# Patient Record
(demographics unavailable — no encounter records)

---

## 2024-11-12 NOTE — DISCUSSION/SUMMARY
[FreeTextEntry1] : gyn exam menopause pap Discussion and interpretation of previous tests , external notes( labs, radiology, specialist  , patient verbalized understanding.   Bone  Health maintenance with calcium and Vitamin D. Recommend weight bearing exercises for at least 30 minutes daily 5 times a week and light resistance strength training for at least two days a week. Osteoporosis screening with a bone density every 2 years. mammogram due december cont bse   weight gain wegovy .5 mg q week #4

## 2024-11-12 NOTE — CHIEF COMPLAINT
[Annual Visit] : annual visit [FreeTextEntry1] :  she started wegovy 11/3 and she hasnt lost anything 1/26/22 gyn   she had a D + C  she sees Dr Ortega getting acupuncture  for oab.

## 2024-11-12 NOTE — REVIEW OF SYSTEMS
[Dysuria] : dysuria [Pelvic Pain] : pelvic pain [Nl] : Integumentary [Recent Wt Gain ___ Lbs] : no recent weight gain

## 2024-11-12 NOTE — HISTORY OF PRESENT ILLNESS
[Good] : being in good health [Healthy Diet] : a healthy diet [Regular Exercise] : regular exercise [Pap Smear Last Year] : Papanicolaou cytology last year [Last Mammogram ___] : last mammogram done [unfilled] [Last Colonoscopy ___] : last colonoscopy done [unfilled] [Postmenopausal] : is postmenopausal [4/10] : is 4/10 in severity [Definite:  ___ (Date)] : the last menstrual period was [unfilled] [Post-Menopause, No Sxs] : post-menopausal, currently without symptoms [Menopause Age: ____] : age at menopause was [unfilled] [Age of First Sexual Lindy ___] : became sexually active at the age of [unfilled] [Male ___] : [unfilled] male [___ Year(s) Ago] : [unfilled] year(s) ago [Weight Concerns] : no concerns with her weight [Continuous] : no continuous [Dull] : no dull [Sharp] : no sharp [Stabbing] : no stabbing [Throbbing] : no throbbing [Fever] : no fever [Nausea] : no nausea [Vomiting] : no vomiting [Diarrhea] : no diarrhea [Vaginal Bleeding] : no vaginal bleeding [Pelvic Pressure] : no pelvic pressure [Dysuria] : no dysuria [Non-opiod Analgesic] : not improved by non-opiod analgesic [Opiod Analgesic] : not improved by opiod analgesic [Rest] : not improved with rest [Activity] : not worsened by activity [Defecation] : not worsened by defecation [Emotional Stress] : not worsened by emotional stress [[place]] : not worsened by intercourse [Lifting] : not worsened by lifting [Menses] : not worsened by menses [Movement] : not worsened by movement [Urination] : not worsened by urination [Appendicitis] : no history of appendicitis [Cholelithiasis] : no history of cholelithiasis [Crohn's Disease] : no history of Crohn's disease [Diverticular Disease] : no history of Diverticular disease [Intrauterine Pregnancy] : no history of intrauterine pregnancy [Nephrolithiasis] : no history of nephrolithiasis [Ovarian Mass] : no history of ovarian mass [Ovarian Torsion] : no history of ovarian torsion [Burning] : no burning [Itching] : no itching [Mass] : no mass [Stinging] : no stinging [Lesion] : no lesion [Soreness] : no soreness [FreeTextEntry9] : no [Hot Flashes] : no hot flashes [Night Sweats] : no night sweats [Vaginal Itching] : no vaginal itching [Dyspareunia] : no dyspareunia [Mood Changes] : no mood changes [FreeTextEntry8] : no [Currently In Menopause] : not currently in menopause [Sexually Active] : is not sexually active

## 2024-11-12 NOTE — COUNSELING
[Breast Self Exam] : breast self exam [Nutrition] : nutrition [Exercise] : exercise [Vitamins/Supplements] : vitamins/supplements [Confidentiality] : confidentiality [Sunscreen] : sunscreen [Medication Management] : medication management [Body Image] : body image

## 2024-11-12 NOTE — OB HISTORY
[Pregnancy History] : boy [___] : no pregnancy complications reported [FreeTextEntry1] : placenta previa

## 2024-12-16 NOTE — HISTORY OF PRESENT ILLNESS
[FreeTextEntry1] : patient presents for follow-up and med renewal on [facility] [de-identified] : 66 yo wf here for weight mgt. she is on wegovy since nov 3 rd. she is paying out of pocket  she weighted  197 she lost  6 pounds she is doing the injection on sunday she is eating: a lot less.  b- yogurt blueberries dates she got protein pancakes instead  or eggs lunch chicken or left over meat loaf  she is not exercising -she is tired b her daughter  has troubles with baby. not tolerating formula

## 2024-12-16 NOTE — ASSESSMENT
[FreeTextEntry1] : overweight The BMI for OBIE was assessed. The BMI was found to be high, intervention performed was: lifestyle education regarding diet. Being overweight can negatively impact your health  by increasing risk of high blood pressure, heart disease, stroke, arthritis, or joint pain and diabetes. It is important to exercise 150 minutes per week as well as maintain a calorie appropriate diet that is rich in fruits and vegetables, and low in excess salt and fat. cont wegovy same dose .5 mg q week  4 injections  fu 4 w mammogram reviewed

## 2025-01-23 NOTE — ASSESSMENT
[FreeTextEntry1] : weight  The BMI for OBIE was assessed. The BMI was found to be 29 intervention performed was: lifestyle education regarding diet. Being overweight can negatively impact your health  by increasing risk of high blood pressure, heart disease, stroke, arthritis, or joint pain and diabetes. It is important to exercise 150 minutes per week as well as maintain a calorie appropriate diet that is rich in fruits and vegetables, and low in excess salt and fat. she has been on wegovy and she does not feel appetite suppression. she is not exercising. she lost total 7 pounds. goal is 170 she is 5'7"  increase wegovy to 1 mg q week . she takes on sundays. she is eats the most usually m-w. she is encouraged to exercise, use bands. do home exercise. she says it is too cold and she doesnt feel well.   encouraged to have swab, cxr she refused.  fu  1m

## 2025-01-23 NOTE — HISTORY OF PRESENT ILLNESS
[Home] : at home, [unfilled] , at the time of the visit. [Medical Office: ([facility])___] : at the medical office located in  [Verbal consent obtained from patient] : the patient, [unfilled] [FreeTextEntry1] : follow up [de-identified] : Patient initiated communication for concern via HIPAA secure platform  (Telemedicine )  for         follow up             using  telehealth            .Reviewed quarantine instructions and self isolation to limit spread of disease  as per [facility] guidelines.  Patient is an established patient and has verbalized consent to be treated via telemedicine . she lost 4 pounds since dec 16. she does not feel appetite suppression.    she would like to increase the dose. she has a goal of 170 . she is mindful of what she eats she keeps track breakfast egg yogurt oatmeal snack bag of cranberries. lunch is protein. she bumps up protein somehow someway. she is not exercising  bc of the cold and she does not feel well..  she does babysit.  co ongoing cough since christmas she tested neg for covid. she is not short of breath no fever  she does not want a swab or cxr today

## 2025-01-23 NOTE — REVIEW OF SYSTEMS
[Fever] : no fever [Chills] : no chills [Fatigue] : no fatigue [Shortness Of Breath] : no shortness of breath [Wheezing] : no wheezing [Cough] : cough [Negative] : Cardiovascular [FreeTextEntry2] : -4  (-7 total) bm 29

## 2025-02-19 NOTE — ASSESSMENT
[FreeTextEntry1] : bmi 30 The BMI for OBIE was assessed. The BMI was found to be high, intervention performed was: lifestyle education regarding diet. Being overweight can negatively impact your health  by increasing risk of high blood pressure, heart disease, stroke, arthritis, or joint pain and diabetes. It is important to exercise 150 minutes per week as well as maintain a calorie appropriate diet that is rich in fruits and vegetables, and low in excess salt and fat. increase wegovy to 1.7 q week

## 2025-02-19 NOTE — HISTORY OF PRESENT ILLNESS
[FreeTextEntry1] : patient presents for follow up and wants an increase on medication ( wegovy 1MG/0.5ML)  limited portion    [de-identified] : 66 yo wf hx of adenomyosis insulin resistance syndrome oab osteopenia thyroid nodule BMI 39 she still feels hungry  she is limiting her portions.  she eats meat veg and salad. she takes less she is exercising but it is too cold

## 2025-03-19 NOTE — ASSESSMENT
[FreeTextEntry1] : bmi 29 The BMI for BOIE was assessed. The BMI was found to be high, intervention performed was: lifestyle education regarding diet. Being overweight can negatively impact your health  by increasing risk of high blood pressure, heart disease, stroke, arthritis, or joint pain and diabetes. It is important to exercise 150 minutes per week as well as maintain a calorie appropriate diet that is rich in fruits and vegetables, and low in excess salt and fat. increase wegovy to 2.4 q week exercise daily.  consider prev 20  mc

## 2025-03-19 NOTE — CURRENT MEDS
[Takes medication as prescribed] : takes [Lack of understanding] : lack of understanding [Other ___] : [unfilled] [Yes] : Reviewed medication list for presence of high-risk medications. [Benzodiazepines] : benzodiazepines [Opioids] : opioids [Blood Thinners] : blood thinners [Muscle Relaxants] : muscle relaxants [Sedatives] : sedatives

## 2025-03-19 NOTE — HISTORY OF PRESENT ILLNESS
[FreeTextEntry1] : patient presents for follow up and wants an increase on medication ( wegovy 1MG/0.5ML)  limited portion    [de-identified] : 68 yo wf hx of adenomyosis insulin resistance syndrome oab osteopenia thyroid nodule she is on wegovy  she is not feeling full. she stops eating more on her own she keeps her portion sizes small and controlled. she is going to the gym 1 x week,.   2/19/25  BMI 29 she still feels hungry  she is limiting her portions.  she eats meat veg and salad. she takes less she is exercising but it is too cold